# Patient Record
Sex: MALE | Race: WHITE | NOT HISPANIC OR LATINO | URBAN - METROPOLITAN AREA
[De-identification: names, ages, dates, MRNs, and addresses within clinical notes are randomized per-mention and may not be internally consistent; named-entity substitution may affect disease eponyms.]

---

## 2020-05-29 NOTE — PATIENT DISCUSSION
[facility] Visual [facility] 36 point screen: I have reviewed the visual fields both taped and untaped on this patient which demonstrate significant obstruction of the patient's peripheral visual field on both eyes.

## 2020-07-31 NOTE — PATIENT DISCUSSION
Also, please do not hesitate to call us if you have any concerns not addressed by this information. Please call [phone] and we will do everything we can to help you during this period.

## 2021-11-17 NOTE — PATIENT DISCUSSION
12/20/21: *** phone call between pt and JOD: PT only doing OD for now and switching from epi-lasek to i-lasik OD ONLY 12/20/21****.

## 2021-11-17 NOTE — PATIENT DISCUSSION
*** phone call between pt and JOD: PT only doing OD for now and switching from epi-lasek to i-lasik OD ONLY 12/20/21****.

## 2022-11-22 ENCOUNTER — NEW PATIENT (OUTPATIENT)
Dept: URBAN - METROPOLITAN AREA CLINIC 1 | Facility: CLINIC | Age: 72
End: 2022-11-22

## 2022-11-22 DIAGNOSIS — H01.021: ICD-10-CM

## 2022-11-22 DIAGNOSIS — H01.024: ICD-10-CM

## 2022-11-22 DIAGNOSIS — H16.143: ICD-10-CM

## 2022-11-22 DIAGNOSIS — H25.813: ICD-10-CM

## 2022-11-22 DIAGNOSIS — H04.123: ICD-10-CM

## 2022-11-22 PROCEDURE — 99204 OFFICE O/P NEW MOD 45 MIN: CPT

## 2022-11-22 ASSESSMENT — KERATOMETRY
OS_K2POWER_DIOPTERS: 41.00
OS_K1POWER_DIOPTERS: 40.25
OD_K2POWER_DIOPTERS: 41.25
OD_AXISANGLE2_DEGREES: 173
OS_AXISANGLE2_DEGREES: 2
OD_K1POWER_DIOPTERS: 39.75
OD_AXISANGLE_DEGREES: 83
OS_AXISANGLE_DEGREES: 092

## 2022-11-22 ASSESSMENT — VISUAL ACUITY
OS_SC: 20/50
OD_CC: J1
OD_SC: 20/40
OD_BAT: 20/50
OS_BAT: 20/70
OS_CC: J1

## 2022-11-22 ASSESSMENT — TONOMETRY
OD_IOP_MMHG: 15
OS_IOP_MMHG: 15

## 2023-01-31 ENCOUNTER — PRE-OP/H&P (OUTPATIENT)
Dept: URBAN - METROPOLITAN AREA CLINIC 1 | Facility: CLINIC | Age: 73
End: 2023-01-31

## 2023-01-31 VITALS
WEIGHT: 194 LBS | BODY MASS INDEX: 31.18 KG/M2 | SYSTOLIC BLOOD PRESSURE: 121 MMHG | DIASTOLIC BLOOD PRESSURE: 64 MMHG | HEART RATE: 70 BPM | HEIGHT: 66 IN

## 2023-01-31 DIAGNOSIS — H25.813: ICD-10-CM

## 2023-01-31 PROCEDURE — 92025 CPTRIZED CORNEAL TOPOGRAPHY: CPT

## 2023-01-31 PROCEDURE — 92012 INTRM OPH EXAM EST PATIENT: CPT

## 2023-01-31 PROCEDURE — 92136 OPHTHALMIC BIOMETRY: CPT

## 2023-01-31 ASSESSMENT — VISUAL ACUITY
OD_BAT: 20/50
OD_SC: 20/40
OS_SC: 20/50
OS_BAT: 20/70

## 2023-01-31 ASSESSMENT — KERATOMETRY
OD_AXISANGLE2_DEGREES: 173
OD_K2POWER_DIOPTERS: 41.25
OS_K2POWER_DIOPTERS: 41.00
OS_K1POWER_DIOPTERS: 40.25
OD_AXISANGLE_DEGREES: 83
OS_AXISANGLE2_DEGREES: 2
OS_AXISANGLE_DEGREES: 092
OD_K1POWER_DIOPTERS: 39.75

## 2023-01-31 ASSESSMENT — TONOMETRY
OD_IOP_MMHG: 15
OS_IOP_MMHG: 15